# Patient Record
Sex: FEMALE | Race: BLACK OR AFRICAN AMERICAN
[De-identification: names, ages, dates, MRNs, and addresses within clinical notes are randomized per-mention and may not be internally consistent; named-entity substitution may affect disease eponyms.]

---

## 2019-03-12 ENCOUNTER — HOSPITAL ENCOUNTER (EMERGENCY)
Dept: HOSPITAL 74 - FER | Age: 24
Discharge: HOME | End: 2019-03-12
Payer: COMMERCIAL

## 2019-03-12 VITALS — SYSTOLIC BLOOD PRESSURE: 112 MMHG | DIASTOLIC BLOOD PRESSURE: 72 MMHG | HEART RATE: 73 BPM | TEMPERATURE: 98.1 F

## 2019-03-12 VITALS — BODY MASS INDEX: 23.8 KG/M2

## 2019-03-12 DIAGNOSIS — L03.011: Primary | ICD-10-CM

## 2019-03-12 NOTE — PDOC
History of Present Illness





- General


Chief Complaint: Pain


Stated Complaint: RIGHT 3 RD FINGER PAIN AND SWELLING


Time Seen by Provider: 03/12/19 17:44





- History of Present Illness


Initial Comments: 


22yo F with no significant PMH presenting with R. third digit pain and 

swelling. Patient states she has had this before. About three years ago, she 

sustained an infection of the same finger after she bit her cuticle. A provider 

prescribed her antibiotics which resolved the pain. Patient admits to biting 

her cuticle on that finger once again and has had pain and swelling for the 

last two days. The swelling was more severe yesterday: "I though my finger was 

going to explode." Patient has not taken anything for her pain. She endorses 

normal mobility and sensation in the digit. No fevers or chills. 








Past History





- Past Medical History


Allergies/Adverse Reactions: 


 Allergies











Allergy/AdvReac Type Severity Reaction Status Date / Time


 


No Known Allergies Allergy   Verified 03/12/19 17:40











Home Medications: 


Ambulatory Orders





Amoxicillin/Potassium Clav [Augmentin 875-125 Tablet] 1 each PO BID #14 tablet 

03/12/19 








COPD: No


Other medical history: pt denies





- Suicide/Smoking/Psychosocial Hx


Smoking History: Never smoked


Hx Alcohol Use: No


Drug/Substance Use Hx: No





**Review of Systems





- Review of Systems


Comments:: 


Constitutional: no fever, no chills


HEENT: no throat pain, no dysphagia


Cardiovascular: no chest pain, no palpitations


Respiratory: no cough, no shortness of breath


Gastrointestinal: no abdominal pain, no nausea


Genitourinary: no dysuria, no frequency


Musculoskeletal: no myalgia, no arthralgia


Skin: no rash, +finger pain


Neurologic: no headache, no dizziness














*Physical Exam





- Vital Signs


 Last Vital Signs











Temp Pulse Resp BP Pulse Ox


 


 98.1 F   73   18   112/72   99 


 


 03/12/19 17:39  03/12/19 17:39  03/12/19 17:39  03/12/19 17:39  03/12/19 17:39














- Physical Exam


Comments: 


General: Awake, alert, and fully oriented, in no acute distress


Head: No signs of trauma


Eyes: EOMI, sclera anicteric


ENT: Moist mucus membranes


Neck: Normal ROM, supple


Lungs: Lungs clear, Normal breath sounds


Cardio: Regular rhythm, S1 and S2 present


Abdomen: Soft, nontender.


Extremities: Normal range of motion, Distal pulses present


Distal phalange of third digit of R hand with swelling, induration, and erythema

, most significant along the nailbed. No fluctuance. 


SKIN: Warm, Dry, normal turgor


Neurologic: Cranial nerves II through XII grossly intact. Normal speech





Moderate Sedation





- Procedure Monitoring


Vital Signs: 


Procedure Monitoring Vital Signs











Temperature  98.1 F   03/12/19 17:39


 


Pulse Rate  73   03/12/19 17:39


 


Respiratory Rate  18   03/12/19 17:39


 


Blood Pressure  112/72   03/12/19 17:39


 


O2 Sat by Pulse Oximetry (%)  99   03/12/19 17:39











Medical Decision Making





- Medical Decision Making


22yo F with no significant PMH presenting with R. third digit pain and 

swelling. 





Patient with exam consistent with paronychia


Augmentin sent to pharmacy





Patient discharged











*DC/Admit/Observation/Transfer


Diagnosis at time of Disposition: 


 Paronychia








- Discharge Dispostion


Disposition: HOME


Condition at time of disposition: Stable





- Prescriptions


Prescriptions: 


Amoxicillin/Potassium Clav [Augmentin 875-125 Tablet] 1 each PO BID #14 tablet





- Referrals





- Patient Instructions


Printed Discharge Instructions:  DI for Paronychia


Additional Instructions: 


You came into the ED for finger pain. We suspect a skin/soft tissue infection 

called paronychia. Please review the attached handout.  





Antibiotics prescription sent to your pharmacy. Take as instructed. 





You can use over-the-counter tylenol or motrin for pain. Follow the 

instructions on the medication bottle. 





Seek immediate medical attention if you experience new or worsening symptoms. 

If you think you are having an emergency, call for emergency medical services 

or present to the emergency department right away. 








- Post Discharge Activity

## 2019-03-12 NOTE — PDOC
Attending Attestation





- Resident


Resident Name: Fannie Dykes





- ED Attending Attestation


I have performed the following: I have examined & evaluated the patient, The 

case was reviewed & discussed with the resident, I agree w/resident's findings 

& plan, Exceptions are as noted





- HPI


HPI: 





03/12/19 18:00





23 year old female with no past medical history presents with paronychia of 3rd 

right digit.


Several days ago, Pt noticed a hanging cuticle.


Started to pick at it her with her teeth.


Since then, the pt started to notice and redness and pain at the base of her 

3rd nail.


No numbness, weakness.


No fevers.


Used warm soaks which helped with the pain.


Pt has had this prior infection before when she bit on her fingers.








- Physicial Exam


PE: 





03/12/19 18:02





GENERAL: Awake, alert, and fully oriented, in no acute distress


HEAD: No signs of trauma


EYES: EOMI, sclera anicteric, conjunctiva clear


ENT: Auricles normal inspection, hearing grossly normal, nares patent


NECK: Normal ROM, supple


EXTREMITIES: Normal range of motion, no edema.  No clubbing or cyanosis. No 

cords, erythema, or tenderness


RUE: 3rd digit with erythema, TTP, at based of 3rd nail. No palpable fluctuance 

or drainage.


NEUROLOGICAL: Cranial nerves II through XII grossly intact.  Normal speech, 

normal gait


SKIN: Warm, Dry, normal turgor, no rashes or lesions noted.





- Medical Decision Making





03/12/19 18:02





Vital Signs











Temp Pulse Resp BP Pulse Ox


 


 98.1 F   73   18   112/72   99 


 


 03/12/19 17:39  03/12/19 17:39  03/12/19 17:39  03/12/19 17:39  03/12/19 17:39








Findings are consistent with paronychia, likely from biting from mouth.


No fluctuance to drainage, so no need for I&D.


Will however initiate augmentin PO x 7 days.


Follow up with PMD